# Patient Record
Sex: FEMALE | URBAN - NONMETROPOLITAN AREA
[De-identification: names, ages, dates, MRNs, and addresses within clinical notes are randomized per-mention and may not be internally consistent; named-entity substitution may affect disease eponyms.]

---

## 2019-11-06 ENCOUNTER — OFFICE VISIT (OUTPATIENT)
Dept: URBAN - NONMETROPOLITAN AREA CLINIC 18 | Facility: CLINIC | Age: 69
End: 2019-11-06
Payer: COMMERCIAL

## 2019-11-06 DIAGNOSIS — H40.013 OPEN ANGLE WITH BORDERLINE FINDINGS, LOW RISK, BILATERAL: ICD-10-CM

## 2019-11-06 DIAGNOSIS — H35.371 PUCKERING OF MACULA, RIGHT EYE: ICD-10-CM

## 2019-11-06 DIAGNOSIS — H43.811 VITREOUS DEGENERATION, RIGHT EYE: ICD-10-CM

## 2019-11-06 PROCEDURE — 92004 COMPRE OPH EXAM NEW PT 1/>: CPT | Performed by: OPTOMETRIST

## 2019-11-06 ASSESSMENT — INTRAOCULAR PRESSURE
OS: 12
OD: 12

## 2019-11-06 ASSESSMENT — VISUAL ACUITY
OD: 20/20
OS: 20/20

## 2019-11-06 NOTE — IMPRESSION/PLAN
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 Plan: Discussed condition in detail with patient. Laly with instructions provided. Patient knows to return to clinic immediately if any changes in vision are experienced.

## 2019-12-13 ENCOUNTER — OFFICE VISIT (OUTPATIENT)
Dept: URBAN - NONMETROPOLITAN AREA CLINIC 18 | Facility: CLINIC | Age: 69
End: 2019-12-13

## 2019-12-13 PROCEDURE — V2799 MISC VISION ITEM OR SERVICE: HCPCS | Performed by: OPTOMETRIST

## 2019-12-13 ASSESSMENT — INTRAOCULAR PRESSURE
OS: 12
OD: 12

## 2019-12-13 ASSESSMENT — VISUAL ACUITY
OD: 20/25
OS: 20/20

## 2021-09-21 ENCOUNTER — OFFICE VISIT (OUTPATIENT)
Dept: URBAN - NONMETROPOLITAN AREA CLINIC 18 | Facility: CLINIC | Age: 71
End: 2021-09-21
Payer: COMMERCIAL

## 2021-09-21 DIAGNOSIS — H16.143 PUNCTATE KERATITIS, BILATERAL: ICD-10-CM

## 2021-09-21 DIAGNOSIS — H35.361 DRUSEN OF MACULA OF RIGHT EYE: ICD-10-CM

## 2021-09-21 DIAGNOSIS — H52.03 HYPERMETROPIA, BILATERAL: Primary | ICD-10-CM

## 2021-09-21 DIAGNOSIS — H25.13 AGE-RELATED NUCLEAR CATARACT, BILATERAL: ICD-10-CM

## 2021-09-21 PROCEDURE — 92014 COMPRE OPH EXAM EST PT 1/>: CPT | Performed by: OPTOMETRIST

## 2021-09-21 ASSESSMENT — VISUAL ACUITY
OS: 20/25
OD: 20/25

## 2021-09-21 ASSESSMENT — INTRAOCULAR PRESSURE
OD: 13
OS: 13

## 2021-09-21 NOTE — IMPRESSION/PLAN
Impression: Punctate keratitis, bilateral: H16.143. Plan: Discussed condition with patient in detail. Recommend treatment with Preservative Free Artificial tears QID. RTC if symptoms continue.

## 2021-09-21 NOTE — IMPRESSION/PLAN
Impression: Open angle with borderline findings, low risk, bilateral: H40.013. Plan: Discussed status of examination with patient. Recommend glaucoma workup with pachymetry and ON OCT. Patient understands risk associated with condition and need for monitoring.

## 2023-06-27 ENCOUNTER — OFFICE VISIT (OUTPATIENT)
Dept: URBAN - NONMETROPOLITAN AREA CLINIC 18 | Facility: CLINIC | Age: 73
End: 2023-06-27
Payer: COMMERCIAL

## 2023-06-27 DIAGNOSIS — H52.03 HYPERMETROPIA, BILATERAL: Primary | ICD-10-CM

## 2023-06-27 DIAGNOSIS — H04.123 DRY EYE SYNDROME OF BILATERAL LACRIMAL GLANDS: ICD-10-CM

## 2023-06-27 DIAGNOSIS — H35.361 DRUSEN OF MACULA OF RIGHT EYE: ICD-10-CM

## 2023-06-27 DIAGNOSIS — H40.033 ANATOMICAL NARROW ANGLE, BILATERAL: ICD-10-CM

## 2023-06-27 DIAGNOSIS — H40.013 OPEN ANGLE WITH BORDERLINE FINDINGS, LOW RISK, BILATERAL: ICD-10-CM

## 2023-06-27 DIAGNOSIS — H02.889 MEIBOMIAN GLAND DYSFUNCTION OF EYE: ICD-10-CM

## 2023-06-27 DIAGNOSIS — H25.13 AGE-RELATED NUCLEAR CATARACT, BILATERAL: ICD-10-CM

## 2023-06-27 PROCEDURE — 92014 COMPRE OPH EXAM EST PT 1/>: CPT | Performed by: OPTOMETRIST

## 2023-06-27 ASSESSMENT — KERATOMETRY
OS: 41.63
OD: 42.25

## 2023-06-27 ASSESSMENT — INTRAOCULAR PRESSURE
OD: 18
OD: 20
OS: 18
OS: 20

## 2023-06-27 NOTE — IMPRESSION/PLAN
Impression: Meibomian gland dysfunction of eye: H02.889. Plan: Discussed warm compresses and gentle lid massages.

## 2023-06-27 NOTE — IMPRESSION/PLAN
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 Plan: Signs and symptoms of angle closure reviewed with patient in detail. Risk of potential irreversible damage to optic nerve was discussed. These symptoms include: increased pain, redness, or vision changes. Patient educated to contact clinic immediately if any new symptoms experienced.

## 2023-06-27 NOTE — IMPRESSION/PLAN
Impression: Open angle with borderline findings, low risk, bilateral: H40.013. Plan: Discussed status of examination with patient. Recommend workup with pachymetry and ON OCT in 1 year. Patient understands risk associated with condition and need for monitoring.
